# Patient Record
Sex: MALE | Race: WHITE | URBAN - METROPOLITAN AREA
[De-identification: names, ages, dates, MRNs, and addresses within clinical notes are randomized per-mention and may not be internally consistent; named-entity substitution may affect disease eponyms.]

---

## 2017-04-06 ENCOUNTER — HISTORICAL (OUTPATIENT)
Dept: RADIOLOGY | Facility: HOSPITAL | Age: 57
End: 2017-04-06

## 2017-05-02 ENCOUNTER — HISTORICAL (OUTPATIENT)
Dept: SURGERY | Facility: HOSPITAL | Age: 57
End: 2017-05-02

## 2017-05-26 ENCOUNTER — HISTORICAL (OUTPATIENT)
Dept: LAB | Facility: HOSPITAL | Age: 57
End: 2017-05-26

## 2017-05-26 LAB
ABS NEUT (OLG): 3.14 X10(3)/MCL (ref 2.1–9.2)
ALBUMIN SERPL-MCNC: 4 GM/DL (ref 3.4–5)
ALBUMIN/GLOB SERPL: 1.3 RATIO (ref 1.1–2)
ALP SERPL-CCNC: 55 UNIT/L (ref 50–136)
ALT SERPL-CCNC: 32 UNIT/L (ref 12–78)
APPEARANCE, UA: CLEAR
AST SERPL-CCNC: 11 UNIT/L (ref 15–37)
BACTERIA SPEC CULT: NORMAL /HPF
BASOPHILS # BLD AUTO: 0 X10(3)/MCL (ref 0–0.2)
BASOPHILS NFR BLD AUTO: 1 %
BILIRUB SERPL-MCNC: 0.6 MG/DL (ref 0.2–1)
BILIRUB UR QL STRIP: NEGATIVE
BILIRUBIN DIRECT+TOT PNL SERPL-MCNC: 0.1 MG/DL (ref 0–0.5)
BILIRUBIN DIRECT+TOT PNL SERPL-MCNC: 0.5 MG/DL (ref 0–0.8)
BUN SERPL-MCNC: 11 MG/DL (ref 7–18)
CALCIUM SERPL-MCNC: 9.1 MG/DL (ref 8.5–10.1)
CHLORIDE SERPL-SCNC: 102 MMOL/L (ref 98–107)
CO2 SERPL-SCNC: 30 MMOL/L (ref 21–32)
COLOR UR: YELLOW
CREAT SERPL-MCNC: 0.88 MG/DL (ref 0.7–1.3)
EOSINOPHIL # BLD AUTO: 0.1 X10(3)/MCL (ref 0–0.9)
EOSINOPHIL NFR BLD AUTO: 2 %
ERYTHROCYTE [DISTWIDTH] IN BLOOD BY AUTOMATED COUNT: 12.5 % (ref 11.5–17)
GLOBULIN SER-MCNC: 3 GM/DL (ref 2.4–3.5)
GLUCOSE (UA): NEGATIVE
GLUCOSE SERPL-MCNC: 99 MG/DL (ref 74–106)
HCT VFR BLD AUTO: 43 % (ref 42–52)
HGB BLD-MCNC: 14.4 GM/DL (ref 14–18)
HGB UR QL STRIP: NEGATIVE
INR PPP: 0.99 (ref 0–1.27)
KETONES UR QL STRIP: NEGATIVE
LEUKOCYTE ESTERASE UR QL STRIP: NEGATIVE
LYMPHOCYTES # BLD AUTO: 2.1 X10(3)/MCL (ref 0.6–4.6)
LYMPHOCYTES NFR BLD AUTO: 35 %
MCH RBC QN AUTO: 32 PG (ref 27–31)
MCHC RBC AUTO-ENTMCNC: 33.5 GM/DL (ref 33–36)
MCV RBC AUTO: 95.6 FL (ref 80–94)
MONOCYTES # BLD AUTO: 0.6 X10(3)/MCL (ref 0.1–1.3)
MONOCYTES NFR BLD AUTO: 10 %
NEUTROPHILS # BLD AUTO: 3.14 X10(3)/MCL (ref 1.4–7.9)
NEUTROPHILS NFR BLD AUTO: 52 %
NITRITE UR QL STRIP: NEGATIVE
PH UR STRIP: 6 [PH] (ref 5–9)
PLATELET # BLD AUTO: 172 X10(3)/MCL (ref 130–400)
PMV BLD AUTO: 10.2 FL (ref 9.4–12.4)
POTASSIUM SERPL-SCNC: 4.5 MMOL/L (ref 3.5–5.1)
PROT SERPL-MCNC: 7 GM/DL (ref 6.4–8.2)
PROT UR QL STRIP: NEGATIVE
PROTHROMBIN TIME: 12.9 SECOND(S) (ref 12.1–14.2)
RBC # BLD AUTO: 4.5 X10(6)/MCL (ref 4.7–6.1)
RBC #/AREA URNS HPF: NORMAL /[HPF]
SODIUM SERPL-SCNC: 141 MMOL/L (ref 136–145)
SP GR UR STRIP: 1.02 (ref 1–1.03)
SQUAMOUS EPITHELIAL, UA: NORMAL
T3FREE SERPL-MCNC: 2.57 PG/ML (ref 2.18–3.98)
T4 FREE SERPL-MCNC: 0.91 NG/DL (ref 0.76–1.46)
TSH SERPL-ACNC: 1.29 MIU/ML (ref 0.36–3.74)
UROBILINOGEN UR STRIP-ACNC: 0.2
WBC # SPEC AUTO: 6 X10(3)/MCL (ref 4.5–11.5)
WBC #/AREA URNS HPF: NORMAL /HPF

## 2017-06-05 ENCOUNTER — HISTORICAL (OUTPATIENT)
Dept: ADMINISTRATIVE | Facility: HOSPITAL | Age: 57
End: 2017-06-05

## 2017-06-05 LAB — GROUP & RH: NORMAL

## 2017-08-15 ENCOUNTER — HISTORICAL (OUTPATIENT)
Dept: ADMINISTRATIVE | Facility: HOSPITAL | Age: 57
End: 2017-08-15

## 2017-09-12 ENCOUNTER — HISTORICAL (OUTPATIENT)
Dept: ADMINISTRATIVE | Facility: HOSPITAL | Age: 57
End: 2017-09-12

## 2017-12-06 ENCOUNTER — HISTORICAL (OUTPATIENT)
Dept: ADMINISTRATIVE | Facility: HOSPITAL | Age: 57
End: 2017-12-06

## 2018-02-28 ENCOUNTER — HISTORICAL (OUTPATIENT)
Dept: RADIOLOGY | Facility: HOSPITAL | Age: 58
End: 2018-02-28

## 2018-06-21 ENCOUNTER — HISTORICAL (OUTPATIENT)
Dept: RADIOLOGY | Facility: HOSPITAL | Age: 58
End: 2018-06-21

## 2018-07-02 ENCOUNTER — HISTORICAL (OUTPATIENT)
Dept: LAB | Facility: HOSPITAL | Age: 58
End: 2018-07-02

## 2018-08-03 ENCOUNTER — HISTORICAL (OUTPATIENT)
Dept: RADIOLOGY | Facility: HOSPITAL | Age: 58
End: 2018-08-03

## 2022-04-09 ENCOUNTER — HISTORICAL (OUTPATIENT)
Dept: ADMINISTRATIVE | Facility: HOSPITAL | Age: 62
End: 2022-04-09

## 2022-04-25 VITALS
DIASTOLIC BLOOD PRESSURE: 80 MMHG | SYSTOLIC BLOOD PRESSURE: 129 MMHG | BODY MASS INDEX: 43.46 KG/M2 | HEIGHT: 67 IN | WEIGHT: 276.88 LBS

## 2022-04-30 NOTE — OP NOTE
DATE OF SURGERY:    06/05/2017    SURGEON:  Cain Briseno MD    ASSISTANT:  Jessica Ochoa NP    PREOPERATIVE DIAGNOSES:    1. Right cervical radiculopathy.   2. Left carpal tunnel syndrome.    POSTOPERATIVE DIAGNOSES:    1. Right cervical radiculopathy.   2. Left carpal tunnel syndrome.    PROCEDURES:    1. Right T3-4 and right T4-5 hemilaminotomy, medial facetectomy and foraminotomies.   2. Utilization of microscope for microsurgery.   3. Utilization of neuromonitoring.   4. Left carpal tunnel release.    ANESTHESIA:  General endotracheal anesthesia.    INDICATION FOR SURGERY:  The patient has prior history of left cervical radiculopathy with foraminotomies who has now right-sided neck and right arm pain in C4-C5 distribution and also has carpal tunnel syndrome.  We discussed doing a right C3-4 and C4-5 foraminotomies alone with left carpal tunnel release.  He understood the risks, benefits, alternatives and indication as explained to him.  Consents were signed.    PROCEDURE IN DETAIL:  The patient was taken to the Operating Room, identified by name and medical number, intubated with general endotracheal anesthesia without any difficulty.  IV lines were established.  Preoperative antibiotics were given.  Neuromonitoring leads were placed.  He was pinned to the Paul and turned prone on chest rolls.  Pressure points were padded.  His shoulders were taped down.  His posterior cervical neck area was marked out along his prior incision in midline incision.  He was prepped and draped in appropriate sterile fashion.  Timeout was performed.  Local anesthesia was used.  Incision was made in midline and the right paraspinal muscles were reflected and held in place with retractors.  We obtained intraoperative localizing x-ray to identify the C3-4 and C4-5 levels.  We draped the microscope and brought it into view.  I used it for the remainder of the case.  I began at 3-4 level and performed a C3-4 hemilaminotomy  and medial facetectomy identifying the C4 nerve root and performing a foraminotomy at that level.  I coated the nerve root with a Medrol soaked Gelfoam and Surgiflo for hemostasis.  I worked my way caudally to the 4-5 level again identifying the 4-5 level and performing a hemilaminotomy, medial facetectomy and performing a foraminotomy on the right at C5 nerve root.  Once that was decompressed, we coated the nerve root with Depo-Medrol soaked Gelfoam and Surgiflo for hemostasis.  We irrigated with bacitracin irrigation and obtained hemostasis in the paraspinal muscles.  We began to close with 0 Vicryl, 2-0 Vicryl, 4-0 Monocryl and Dermabond.  The patient was turned supine and Paul was taken off.  At this time, he was repositioned and prepped the left arm for the second portion of the operation.  His left upper extremity was prepped and draped in appropriate sterile fashion.  At this time, we commenced with the left carpal tunnel release identifying appropriate landmarks.  I infiltrated the incision with local anesthesia.  We used the iron hand and made an incision through the skin and fascia.  We cauterized the subfascial oozing.  We identified the transverse carpal ligament and this was incised with a #15 blade and then dissecting the median nerve from underneath it with #4 Penfield.  We used scissors to incise the transverse carpal ligament distally and then proximally.  We released the median nerve along its entire course.  We irrigated with bacitracin irrigation.  Hemostasis was achieved.  We began to close with interrupted 3-0 nylon.  Dressings were applied.  The patient was allowed to recover and was extubated in the OR in stable fashion.  At the end of the operation, no complications.  All lap counts and needle counts were correct.  Estimated blood loss was minimal.  I, Dr. Cain Briseno, performed the entire surgery.  My assistant, Jessica Ochoa, was needed due to complexity of the  case.        ______________________________  MD NATALYA Carrillo  DD:  06/05/2017  Time:  11:09AM  DT:  06/05/2017  Time:  01:12PM  Job #:  [id number]

## 2022-04-30 NOTE — OP NOTE
Patient:   Keegan Dowd            MRN: [id number]            FIN: [id number]               Age:   56 years     Sex:  Male     :  1960   Associated Diagnoses:   None   Author:   Bobby Durand MD          Preoperative Diagnosis: Nuclear sclerotic cataract [Left eye. amd LIANNA papilloma    Postoperative Diagnosis: Same.    Anesthesia: Local    Procedure: Phacoemulsification of cataract with posterior chamber implant of the [[place]. and snip excision of LIANNA papilloma    This patient is a [  ] year old who was given a diagnosis of severe cataracts of both eyes with [20/30  ] vision [OS  ]. The risks and benefits of cataract surgery were explained; the patient was consented and desired to have the surgery done. The patient was given topical anesthesia using 1% Lidocaine Jelly and the patient was prepped and draped in sterile fashion.    The microscope was centered and focused in a temporal position and a super sharp blade was used to make a paracentesis in the corneal limbus. Dispersive Viscoelastic was then placed in the anterior chamber. A 2.4 mm keratome blade was used to enter the anterior chamber in a self-sealing type technique. The cystatome blade was then used to initiate a capsulorrhexis which was completed 360 degrees with Utrata forceps. BSS in an AC cannula was then used to perform hydrodissection and hydrodilineation. Phacoemulsification was then accomplished by creating a deep groove down the middle of the nucleus, then  it into 2 halves with the phacotip and the Marsh chopper and finishing the removal with a stop and chop technique.  The cortex was then removed using the I and A unit. All the lens material was removed without any tears to the anterior or posterior capsule. Cohesive Viscoelastic was then injected to inflate the capsular bag. A foldable lens was then injected into the capsular bag. The lens was observed to be securely placed into the bag. The I and A was then  used to remove the remaining viscoelastic. A 10-0 Biosorb incisional suture   was not] placed. BSS through an A/C cannula was used to perform stromal hydration in the wound. BSS was also used again to reform the chamber and bring the eye to physiologic IOP.  The wound was checked for leaks and none were found. Copious Vigomox drop and 1-2 drops of, Prednisolone Acetate 1% were placed topically prior to removing the lid specular and drapes.  The drapes were then removed. The patient will be sent to recovery and instructed to use Vigamox, Ketorolac, and Predinsolone Acetate 1% three times a day as well as follow all instructions on the postoperative sheet given to them and explained after surgery. The patient will return to see Dr. Durand at their scheduled appointment within 24-36 hours after surgery. Oscar scissors were then used to excise papilloma.      Bobby Durand M.D.              ARELI/gaurang           [date / time]

## 2022-04-30 NOTE — OP NOTE
Patient:   Keegan Dowd            MRN: [id number]            FIN: [id number]               Age:   56 years     Sex:  Male     :  1960   Associated Diagnoses:   None   Author:   Bobby Durand MD          Preoperative Diagnosis: Nuclear sclerotic cataract [Left /   Postoperative Diagnosis: Same.    Anesthesia: Local    Procedure: Phacoemulsification of cataract with posterior chamber implant of the /Right] eye.    This patient is a [  ] year old who was given a diagnosis of severe cataracts of both eyes with [ 20/25 ] vision [od  ]. The risks and benefits of cataract surgery were explained; the patient was consented and desired to have the surgery done. The patient was given topical anesthesia using 1% Lidocaine Jelly and the patient was prepped and draped in sterile fashion.    The microscope was centered and focused in a temporal position and a super sharp blade was used to make a paracentesis in the corneal limbus. Dispersive Viscoelastic was then placed in the anterior chamber. A 2.4 mm keratome blade was used to enter the anterior chamber in a self-sealing type technique. The cystatome blade was then used to initiate a capsulorrhexis which was completed 360 degrees with Utrata forceps. BSS in an AC cannula was then used to perform hydrodissection and hydrodilineation. Phacoemulsification was then accomplished by creating a deep groove down the middle of the nucleus, then  it into 2 halves with the phacotip and the Marsh chopper and finishing the removal with a stop and chop technique.  The cortex was then removed using the I and A unit. All the lens material was removed without any tears to the anterior or posterior capsule. Cohesive Viscoelastic was then injected to inflate the capsular bag. A foldable lens was then injected into the capsular bag. The lens was observed to be securely placed into the bag. The I and A was then used to remove the remaining viscoelastic. A 10-0  Biosorb incisional suture [was placed. BSS through an A/C cannula was used to perform stromal hydration in the wound. BSS was also used again to reform the chamber and bring the eye to physiologic IOP.  The wound was checked for leaks and none were found. Copious Vigomox drop and 1-2 drops of, Prednisolone Acetate 1% were placed topically prior to removing the lid specular and drapes.  The drapes were then removed. The patient will be sent to recovery and instructed to use Vigamox, Ketorolac, and Predinsolone Acetate 1% three times a day as well as follow all instructions on the postoperative sheet given to them and explained after surgery. The patient will return to see Dr. Durand at their scheduled appointment within 24-36 hours after surgery.      Bobby Durand M.D.              ARELI/gaurang           [date / time]